# Patient Record
Sex: FEMALE | NOT HISPANIC OR LATINO | URBAN - METROPOLITAN AREA
[De-identification: names, ages, dates, MRNs, and addresses within clinical notes are randomized per-mention and may not be internally consistent; named-entity substitution may affect disease eponyms.]

---

## 2017-01-31 NOTE — PATIENT DISCUSSION
Posterior Capsular Fibrosis Counseling: The diagnosis of posterior capsular fibrosis (PCF), also referred to as a secondary cataract or posterior capsular opacification (PCO), was discussed with the patient. The patient understands that their symptoms and limitations are likely related to this condition. I have reviewed the risks, benefits and alternatives of  YAG laser surgery for the treatment of the fibrosis. The uncommon risk of an increase in intraocular pressure or a retinal detachment and their associated symptoms were explained to the patient. 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

## 2017-01-31 NOTE — PATIENT DISCUSSION
POSTERIOR CAPSULAR FIBROSIS, Ou__ - VISUALLY SIGNIFICANT. SCHEDULE YAG CAPSULOTOMY Od___ FIRST THEN LATER YAG CAPSULOTOMY Os___ IF VISUAL SYMPTOMS PERSIST.

## 2017-09-18 ENCOUNTER — IMPORTED ENCOUNTER (OUTPATIENT)
Dept: URBAN - METROPOLITAN AREA CLINIC 1 | Facility: CLINIC | Age: 52
End: 2017-09-18

## 2017-09-18 PROBLEM — Z79.84: Noted: 2017-09-18

## 2017-09-18 PROBLEM — H10.45: Noted: 2017-09-18

## 2017-09-18 PROBLEM — E11.9: Noted: 2017-09-18

## 2017-09-18 PROBLEM — H25.813: Noted: 2017-09-18

## 2017-09-18 PROBLEM — H04.123: Noted: 2017-09-18

## 2017-09-18 PROCEDURE — 92015 DETERMINE REFRACTIVE STATE: CPT

## 2017-09-18 PROCEDURE — 92014 COMPRE OPH EXAM EST PT 1/>: CPT

## 2017-09-18 NOTE — PATIENT DISCUSSION
1.  DM Type II (Oral Medication) without sign of diabetic retinopathy and no blot heme on dilated retinal examination today OU No Macular Edema:  Discussed the pathophysiology of diabetes and its effect on the eye and risk of blindness. Stressed the importance of strong glucose control. Advised of importance of at least yearly dilated examinations but to contact us immediately for any problems or concerns. 2. Cataract OU: Observe for now without intervention. The patient was advised to contact us if any change or worsening of vision3. Dry Eyes OU - Recommend ATs BID OU routinely 4. Allergic Conjunctivitis OU -- The condition was  discussed with the patient. Avoidance of allergens and cool compresses were recommended. Advised to DC Visine. Recommend Zaditor BID OU PRN for itching. MRX for glasses givenReturn for an appointment in 1 year 27 with Dr. Judith Olivera.

## 2017-10-30 NOTE — PATIENT DISCUSSION
Posterior Capsular Fibrosis Counseling: The diagnosis of posterior capsular fibrosis (PCF), also referred to as a secondary cataract or posterior capsular opacification (PCO), was discussed with the patient. The patient understands that their symptoms and limitations are likely related to this condition. I have reviewed the risks, benefits and alternatives of  YAG laser surgery for the treatment of the fibrosis. The uncommon risk of an increase in intraocular pressure or a retinal detachment and their associated symptoms were explained to the patient. 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

## 2018-10-08 ENCOUNTER — IMPORTED ENCOUNTER (OUTPATIENT)
Dept: URBAN - METROPOLITAN AREA CLINIC 1 | Facility: CLINIC | Age: 53
End: 2018-10-08

## 2018-10-08 PROBLEM — H10.45: Noted: 2018-10-08

## 2018-10-08 PROBLEM — H04.123: Noted: 2018-10-08

## 2018-10-08 PROBLEM — H25.813: Noted: 2018-10-08

## 2018-10-08 PROBLEM — R73.09: Noted: 2018-10-08

## 2018-10-08 PROCEDURE — 92015 DETERMINE REFRACTIVE STATE: CPT

## 2018-10-08 PROCEDURE — 92014 COMPRE OPH EXAM EST PT 1/>: CPT

## 2018-10-08 NOTE — PATIENT DISCUSSION
1.  DM Type II (Diet Controlled) without sign of diabetic retinopathy and no blot heme on dilated retinal examination today OU No Macular Edema:  Discussed the pathophysiology of diabetes and its effect on the eye and risk of blindness. Stressed the importance of strong glucose control. Advised of importance of at least yearly dilated examinations but to contact us immediately for any problems or concerns. 2. Cataract OU: Observe for now without intervention. The patient was advised to contact us if any change or worsening of vision3. Dry Eyes OU - Recommend ATs TID OU routinely 4. Allergic Conjunctivitis OU :  Condition discussed with patient. Advised patient to use OTC Zaditor one drop OU BID prn. MRX for glasses givenReturn for an appointment in 1 year 27 with Dr. Andrew Aguirre.

## 2019-10-08 ENCOUNTER — IMPORTED ENCOUNTER (OUTPATIENT)
Dept: URBAN - METROPOLITAN AREA CLINIC 1 | Facility: CLINIC | Age: 54
End: 2019-10-08

## 2019-10-08 PROBLEM — R73.09: Noted: 2019-10-08

## 2019-10-08 PROBLEM — H25.813: Noted: 2019-10-08

## 2019-10-08 PROCEDURE — 92015 DETERMINE REFRACTIVE STATE: CPT

## 2019-10-08 PROCEDURE — 92014 COMPRE OPH EXAM EST PT 1/>: CPT

## 2019-10-08 NOTE — PATIENT DISCUSSION
1.  DM Type II (Diet Controlled) without sign of diabetic retinopathy and no blot heme on dilated retinal examination today OU No Macular Edema:  Discussed the pathophysiology of diabetes and its effect on the eye and risk of blindness. Stressed the importance of strong glucose control. Advised of importance of at least yearly dilated examinations but to contact us immediately for any problems or concerns. 2. Cataract OU: Observe for now without intervention. The patient was advised to contact us if any change or worsening of vision3. Dry Eyes OU - Cont ATs TID OU routinely 4. Allergic Conjunctivitis OU :  Cont OTC Zaditor one drop OU BID prn. MRX for glasses givenLetter to PCP Return for an appointment in 1 year 27 with Dr. Manuelito Cooper.

## 2020-11-20 ENCOUNTER — IMPORTED ENCOUNTER (OUTPATIENT)
Dept: URBAN - METROPOLITAN AREA CLINIC 1 | Facility: CLINIC | Age: 55
End: 2020-11-20

## 2020-11-20 PROBLEM — R73.09: Noted: 2020-11-20

## 2020-11-20 PROBLEM — H25.13: Noted: 2020-11-20

## 2020-11-20 PROCEDURE — 92015 DETERMINE REFRACTIVE STATE: CPT

## 2020-11-20 PROCEDURE — 92014 COMPRE OPH EXAM EST PT 1/>: CPT

## 2020-11-20 NOTE — PATIENT DISCUSSION
1.  DM Type II (Diet Controlled) without sign of diabetic retinopathy and no blot heme on dilated retinal examination today OU No Macular Edema:  Discussed the pathophysiology of diabetes and its effect on the eye and risk of blindness. Stressed the importance of strong glucose control. Advised of importance of at least yearly dilated examinations but to contact us immediately for any problems or concerns. 2. Nuclear Cataract OU -- Observe for now without intervention. The patient was advised to contact us if any change or worsening of visionMRx for glasses given to patient. Letter to PCP. Return for an appointment in 1 year 27 with Dr. Aline Ricci.
MRx for glasses given to patient. Letter to PCP. Return for an appointment in 1 year 27 with Dr. Marsha Lewis.
Nuclear Cataract OU -- Observe for now without intervention.  The patient was advised to contact us if any change or worsening of vision
None

## 2021-11-03 ENCOUNTER — IMPORTED ENCOUNTER (OUTPATIENT)
Dept: URBAN - METROPOLITAN AREA CLINIC 1 | Facility: CLINIC | Age: 56
End: 2021-11-03

## 2021-11-03 PROBLEM — E11.9: Noted: 2021-11-03

## 2021-11-03 PROBLEM — Z79.84: Noted: 2021-11-03

## 2021-11-03 PROBLEM — H04.123: Noted: 2021-11-03

## 2021-11-03 PROBLEM — H25.813: Noted: 2021-11-03

## 2021-11-03 PROCEDURE — 92015 DETERMINE REFRACTIVE STATE: CPT

## 2021-11-03 PROCEDURE — 92014 COMPRE OPH EXAM EST PT 1/>: CPT

## 2021-11-03 NOTE — PATIENT DISCUSSION
1.  DM Type II (Oral Med) without sign of diabetic retinopathy and no blot heme on dilated retinal examination today OU No Macular Edema:  Discussed the pathophysiology of diabetes and its effect on the eye and risk of blindness. Stressed the importance of strong glucose control. Advised of importance of at least yearly dilated examinations but to contact us immediately for any problems or concerns. 2. Cataract OU -- Observe for now without intervention. The patient was advised to contact us if any change or worsening of vision3. Dry Eyes OU -- Continue routine use of OTC ATs TID OU. Finalized Glasses Mrx today. Return for an appointment in 1 year for a 30 with Dr. Willard Rocha.

## 2022-04-02 ASSESSMENT — VISUAL ACUITY
OS_CC: J1+
OS_GLARE: 20/50
OS_SC: 20/20
OS_SC: 20/20
OS_GLARE: 20/60
OD_SC: 20/25
OS_GLARE: 20/50
OD_GLARE: 20/50
OS_CC: J2
OD_GLARE: 20/60
OD_SC: 20/25
OS_SC: 20/20
OD_GLARE: 20/50
OD_CC: J1+
OS_SC: 20/25-2
OD_CC: J2
OD_CC: J1
OS_CC: J1
OD_CC: J1+
OD_SC: 20/20
OD_GLARE: 20/50
OD_GLARE: 20/50
OS_GLARE: 20/50
OD_SC: 20/20-1
OD_SC: 20/20
OS_GLARE: 20/50
OS_CC: J1+
OS_SC: 20/25

## 2022-04-02 ASSESSMENT — TONOMETRY
OD_IOP_MMHG: 18
OS_IOP_MMHG: 16
OS_IOP_MMHG: 16
OD_IOP_MMHG: 15
OD_IOP_MMHG: 17
OD_IOP_MMHG: 17
OS_IOP_MMHG: 17
OD_IOP_MMHG: 16
OS_IOP_MMHG: 18
OS_IOP_MMHG: 16

## 2022-04-02 ASSESSMENT — KERATOMETRY
OS_K2POWER_DIOPTERS: 43.75
OS_K1POWER_DIOPTERS: 45.25
OS_AXISANGLE_DEGREES: 173
OD_AXISANGLE2_DEGREES: 077
OD_K2POWER_DIOPTERS: 43.50
OD_AXISANGLE_DEGREES: 167
OD_K1POWER_DIOPTERS: 44.75
OS_AXISANGLE2_DEGREES: 083

## 2022-11-03 ENCOUNTER — COMPREHENSIVE EXAM (OUTPATIENT)
Dept: URBAN - METROPOLITAN AREA CLINIC 1 | Facility: CLINIC | Age: 57
End: 2022-11-03

## 2022-11-03 DIAGNOSIS — H25.813: ICD-10-CM

## 2022-11-03 DIAGNOSIS — E11.9: ICD-10-CM

## 2022-11-03 DIAGNOSIS — H04.123: ICD-10-CM

## 2022-11-03 DIAGNOSIS — H16.143: ICD-10-CM

## 2022-11-03 PROCEDURE — 92015 DETERMINE REFRACTIVE STATE: CPT

## 2022-11-03 PROCEDURE — 92014 COMPRE OPH EXAM EST PT 1/>: CPT

## 2022-11-03 ASSESSMENT — TONOMETRY
OS_IOP_MMHG: 17
OD_IOP_MMHG: 16

## 2022-11-03 ASSESSMENT — VISUAL ACUITY
OD_CC: 20/20
OD_CC: J1+
OS_CC: J1+
OS_CC: 20/20

## 2022-11-03 NOTE — PATIENT DISCUSSION
Glasses RX given today. Observe for now without intervention. The patient was advised to contact office with any change or worsening of vision.

## 2023-11-08 ENCOUNTER — COMPREHENSIVE EXAM (OUTPATIENT)
Dept: URBAN - METROPOLITAN AREA CLINIC 1 | Facility: CLINIC | Age: 58
End: 2023-11-08

## 2023-11-08 DIAGNOSIS — E11.9: ICD-10-CM

## 2023-11-08 DIAGNOSIS — H16.143: ICD-10-CM

## 2023-11-08 DIAGNOSIS — H25.813: ICD-10-CM

## 2023-11-08 DIAGNOSIS — H04.123: ICD-10-CM

## 2023-11-08 PROCEDURE — 92014 COMPRE OPH EXAM EST PT 1/>: CPT

## 2023-11-08 PROCEDURE — 92015 DETERMINE REFRACTIVE STATE: CPT

## 2023-11-08 ASSESSMENT — VISUAL ACUITY
OS_CC: J1
OD_CC: 20/25
OD_CC: J1
OS_CC: 20/25

## 2023-11-08 ASSESSMENT — TONOMETRY
OD_IOP_MMHG: 15
OS_IOP_MMHG: 16

## 2024-11-11 ENCOUNTER — COMPREHENSIVE EXAM (OUTPATIENT)
Dept: URBAN - METROPOLITAN AREA CLINIC 1 | Facility: CLINIC | Age: 59
End: 2024-11-11

## 2024-11-11 DIAGNOSIS — E11.9: ICD-10-CM

## 2024-11-11 DIAGNOSIS — H25.813: ICD-10-CM

## 2024-11-11 DIAGNOSIS — H04.123: ICD-10-CM

## 2024-11-11 DIAGNOSIS — H16.143: ICD-10-CM

## 2024-11-11 PROCEDURE — 92015 DETERMINE REFRACTIVE STATE: CPT

## 2024-11-11 PROCEDURE — 92014 COMPRE OPH EXAM EST PT 1/>: CPT
